# Patient Record
Sex: FEMALE
[De-identification: names, ages, dates, MRNs, and addresses within clinical notes are randomized per-mention and may not be internally consistent; named-entity substitution may affect disease eponyms.]

---

## 2020-04-28 PROBLEM — Z00.00 ENCOUNTER FOR PREVENTIVE HEALTH EXAMINATION: Status: ACTIVE | Noted: 2020-04-28

## 2020-04-29 ENCOUNTER — APPOINTMENT (OUTPATIENT)
Dept: HEMATOLOGY ONCOLOGY | Facility: CLINIC | Age: 55
End: 2020-04-29
Payer: COMMERCIAL

## 2020-04-29 DIAGNOSIS — E66.9 OBESITY, UNSPECIFIED: ICD-10-CM

## 2020-04-29 DIAGNOSIS — D05.11 INTRADUCTAL CARCINOMA IN SITU OF RIGHT BREAST: ICD-10-CM

## 2020-04-29 DIAGNOSIS — Z91.89 OTHER SPECIFIED PERSONAL RISK FACTORS, NOT ELSEWHERE CLASSIFIED: ICD-10-CM

## 2020-04-29 DIAGNOSIS — Z80.3 FAMILY HISTORY OF MALIGNANT NEOPLASM OF BREAST: ICD-10-CM

## 2020-04-29 DIAGNOSIS — D68.59 OTHER PRIMARY THROMBOPHILIA: ICD-10-CM

## 2020-04-29 PROCEDURE — 99205 OFFICE O/P NEW HI 60 MIN: CPT

## 2020-04-29 RX ORDER — TAMOXIFEN CITRATE 20 MG/1
20 TABLET, FILM COATED ORAL DAILY
Qty: 30 | Refills: 0 | Status: ACTIVE | COMMUNITY
Start: 2020-04-29

## 2020-04-29 RX ORDER — TAMOXIFEN CITRATE 20 MG/1
20 TABLET, FILM COATED ORAL
Qty: 30 | Refills: 0 | Status: ACTIVE | COMMUNITY
Start: 2020-04-03

## 2020-04-29 NOTE — HISTORY OF PRESENT ILLNESS
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  with a history of possible DVT now presenting with DCIS. \par \par I first met this patient back in 2017, she had had hematuria due to a kidney stone and had a lithotripsy. Around this time she devloped pain in her  right calf,  was diagnosed with a dvt on ultrasound and started on eliquis 5 mg bid.  The pain became worse and  a repeat ultrasound did not show a dvt, it was thought that the prior ultrasound was due to artifact. \par She subsequently had  2 vascular surgery opinions and was felt to not have a dvt, so she was taken off anticoagulation after a few months. \par Hypercoag work up was sent at that time and was positive for a high factor 8 level(elevated on 2 occasion) , one copy of MTHFR(c677T)  and one copy of GISELA polymorphism \par negative for : apla and lac, juan francisco 2, pnh, FVL, PTGM \par \par She has a strong family hx of breast cancer - mom aunt and cousin. \par Her shaggy score was 2.1%, so she was going for mri of breast in addition to mammo and ultrasound \par \par So in feb 2020 she had a routine mri of breast- and this showed at 0.8 cm nodule in right breast which was up from 0.6cm. Mammo and ultrasoudn were reportedly negative \par Biopsy perfored on march 13,2020 showed atpical proliferation and sent to [facility] for expert consult \par this was revealing for DCIS low to intermediate grade, er pos 85% , pr is 5% \par \par She was to have surgery but bc of the covid outbreak she did not have lumpectomy, she saw another oncologist and started tamoxifen 20 mg a day with a baby asa. \par Patient has tolerated this well , only a little more hot flashes ,  no vaginal bleeding no new complaints \par \par \par  [de-identified] : DCIS  [de-identified] : Patient is feeling well , no sig symptoms since starting tamoxifen , no breast masses , no sob chest pain leg pain or stroke like symptoms \par Surgery is scheduled for june with dr mccarty at [place]

## 2020-04-29 NOTE — ASSESSMENT
[FreeTextEntry1] : THis is a 54 y/o perimenopausal woman with a hx of hypercoaguable state, now with DCIS of the right breast found on screening MRI. \par \par \par 1) DCIS \par reviewd pathology \par -discussed diagnosis at length \par -explained that the treatment of choice would be lumpectomy followed by radiation and then antiestrogen therapy \par -given that patient had a period in july she is perimenopausal, she would not be eligible for ai unless had ovarian suppression or removal of ovaries \par -bc of the covid pandemic all breast surgery has been canceled so patient has been on tamoxifen \par -tolerating tamxoifen well \par -discussed the mechansim at length - reviewed risk of uterine cancer , blood clots and hot flashes , jake in light of prior ? of clot \par -patient does have some mthfr and anuel and factor 8 , but there is no clear relationship with higher risk of clotting with tamoxifen but patient was conseled on the need for deligent montiering and weight loss . \par -patient inquired about cyp2d6 testing , I advised against doing \par -patient also asked about mastecomy in leiu of lumpectomy and i would also not advise as this is a much bigger surgery and is not needed in this case of unifocal disease  , unless patient so desires\par - patient also asked about the oncotype, i would not send until get final path at time of surgery \par -agree with asa 81 mg with tamoxifen \par -cont tamoxifen ,  hold one week prior to surgery and one week after surgery \par -genetics negative \par -check lfts and markers \par \par 2) hypercoaguable \par cont asa \par \par 3) obesity \par weight loss and exercise \par \par 4) gyn up todate \par colonoscopy up todate \par

## 2020-04-29 NOTE — PHYSICAL EXAM
[Fully active, able to carry on all pre-disease performance without restriction] : Status 0 - Fully active, able to carry on all pre-disease performance without restriction [Normal] : affect appropriate [de-identified] : no JVD  [de-identified] : moving all extremities  [de-identified] : breathing normal  [de-identified] : moving all extremities